# Patient Record
Sex: FEMALE | Race: WHITE | NOT HISPANIC OR LATINO | URBAN - METROPOLITAN AREA
[De-identification: names, ages, dates, MRNs, and addresses within clinical notes are randomized per-mention and may not be internally consistent; named-entity substitution may affect disease eponyms.]

---

## 2020-10-30 ENCOUNTER — CARE COORDINATION (OUTPATIENT)
Dept: CARDIOLOGY | Facility: CLINIC | Age: 35
End: 2020-10-30

## 2020-10-30 NOTE — PROGRESS NOTES
OUTREACH Referral- Heart Failure - [place] [facility]        REFERRING PATIENT INFORMATION:    Patient Phone Number:   Home Phone [phone]   Mobile [phone]        PLAN:       Desirae talked to Dr. Rich. Will try to get patient to come to the [facility] on 11/12/20 at 1100 am. If she cannot we will try to get her on the scheduled for a virtual visit on 11/11/20.     December 14, 2020 - We tried to reach out unsuccessfully. Had referral on hold. Was contacted by [facility] a week ago and asked if we could try to get her in again so I forwarded message to HF Nurse. According to recent notes she is having a ICD implanted today.     ATTEMPTS TO CONTACT:  1. November 5, 2020 NA  2. November 6, 2020 NA  3. November 9, 2020 NA  4. December 17, 2020 - On 12/14 spoke to [facility] and they are still wanting to have patient seen by Dr. Rich. I shared with them that we have tried to reach patient and have had no success. I tried again today and was unable to reach patient and unable to leave VM as the VM box was full.       Chidi Stewart CMA  Heart Failure, Advanced Heart Failure & CORE  Referral Specialist &     [facility]  Cardiology  Office: [phone]

## 2020-11-05 PROBLEM — E03.4 HYPOTHYROIDISM DUE TO ACQUIRED ATROPHY OF THYROID: Status: ACTIVE | Noted: 2019-05-01

## 2020-11-05 PROBLEM — R94.31 PROLONGED Q-T INTERVAL ON ECG: Status: ACTIVE | Noted: 2020-07-02

## 2020-11-05 PROBLEM — I50.22 CHRONIC SYSTOLIC CHF (CONGESTIVE HEART FAILURE) (H): Status: ACTIVE | Noted: 2020-07-14

## 2020-11-05 PROBLEM — I42.8 NICM (NONISCHEMIC CARDIOMYOPATHY) (H): Status: ACTIVE | Noted: 2020-07-14

## 2020-11-05 PROBLEM — F41.9 ANXIETY: Status: ACTIVE | Noted: 2020-10-21

## 2020-11-05 PROBLEM — E66.01 OBESITY, MORBID, BMI 50 OR HIGHER (H): Status: ACTIVE | Noted: 2020-07-14

## 2020-11-05 PROBLEM — J45.30 MILD PERSISTENT ASTHMA WITHOUT COMPLICATION: Status: ACTIVE | Noted: 2019-07-10

## 2020-11-05 PROBLEM — R00.0 SINUS TACHYCARDIA: Status: ACTIVE | Noted: 2020-07-02

## 2020-11-05 PROBLEM — J81.1 PULMONARY EDEMA: Status: ACTIVE | Noted: 2020-07-02

## 2020-11-05 PROBLEM — N80.9 ENDOMETRIOSIS: Status: ACTIVE | Noted: 2020-07-20

## 2020-11-05 RX ORDER — METOPROLOL SUCCINATE 50 MG/1
50 TABLET, EXTENDED RELEASE ORAL 2 TIMES DAILY
COMMUNITY
Start: 2020-11-03

## 2020-11-05 RX ORDER — LEVONORGESTREL AND ETHINYL ESTRADIOL 0.15-0.03
KIT ORAL
COMMUNITY
Start: 2020-08-11

## 2020-11-05 RX ORDER — ESCITALOPRAM OXALATE 10 MG/1
10 TABLET ORAL DAILY
COMMUNITY
Start: 2020-10-21

## 2020-11-05 RX ORDER — ALBUTEROL SULFATE 90 UG/1
1 AEROSOL, METERED RESPIRATORY (INHALATION) EVERY 4 HOURS PRN
COMMUNITY

## 2020-11-05 RX ORDER — IPRATROPIUM BROMIDE AND ALBUTEROL SULFATE 2.5; .5 MG/3ML; MG/3ML
SOLUTION RESPIRATORY (INHALATION)
COMMUNITY

## 2020-11-05 RX ORDER — LEVOTHYROXINE SODIUM 200 UG/1
200 TABLET ORAL DAILY
COMMUNITY
Start: 2020-10-21 | End: 2021-10-26

## 2020-11-05 RX ORDER — FEXOFENADINE HCL 180 MG/1
1 TABLET ORAL PRN
COMMUNITY
Start: 2020-05-06

## 2020-11-05 RX ORDER — MONTELUKAST SODIUM 10 MG/1
TABLET ORAL
COMMUNITY
Start: 2020-09-28

## 2020-11-05 RX ORDER — POTASSIUM CHLORIDE 1500 MG/1
TABLET, EXTENDED RELEASE ORAL
COMMUNITY
Start: 2020-07-30

## 2020-11-05 RX ORDER — CLOTRIMAZOLE AND BETAMETHASONE DIPROPIONATE 10; .64 MG/G; MG/G
CREAM TOPICAL
COMMUNITY
Start: 2020-09-04

## 2020-11-05 RX ORDER — AZELASTINE 1 MG/ML
2 SPRAY, METERED NASAL PRN
COMMUNITY

## 2020-11-05 RX ORDER — FUROSEMIDE 40 MG
TABLET ORAL
COMMUNITY
Start: 2020-07-30

## 2021-01-07 NOTE — PROGRESS NOTES
Multiple attempts to contact patient have been unsuccessful. Closing referral.     Chidi Stewart CMA  Heart Failure, Advanced Heart Failure & CORE  Referral Specialist &     [facility]  Cardiology  Office: [phone]  [phone]